# Patient Record
Sex: FEMALE | URBAN - METROPOLITAN AREA
[De-identification: names, ages, dates, MRNs, and addresses within clinical notes are randomized per-mention and may not be internally consistent; named-entity substitution may affect disease eponyms.]

---

## 2022-10-04 ENCOUNTER — OFFICE VISIT (OUTPATIENT)
Dept: URBAN - METROPOLITAN AREA CLINIC 56 | Facility: CLINIC | Age: 27
End: 2022-10-04
Payer: COMMERCIAL

## 2022-10-04 DIAGNOSIS — H00.011 HORDEOLUM EXTERNUM RIGHT UPPER EYELID: Primary | ICD-10-CM

## 2022-10-04 PROCEDURE — 99204 OFFICE O/P NEW MOD 45 MIN: CPT | Performed by: STUDENT IN AN ORGANIZED HEALTH CARE EDUCATION/TRAINING PROGRAM

## 2022-10-04 RX ORDER — AMOXICILLIN AND CLAVULANATE POTASSIUM 875; 125 MG/1; MG/1
TABLET, FILM COATED ORAL
Qty: 20 | Refills: 0 | Status: ACTIVE
Start: 2022-10-04

## 2022-10-04 ASSESSMENT — INTRAOCULAR PRESSURE
OS: 15
OD: 15

## 2022-10-04 ASSESSMENT — KERATOMETRY
OD: 41.88
OS: 41.67

## 2022-10-04 NOTE — IMPRESSION/PLAN
Impression: Hordeolum externum right upper eyelid: H00.011. Plan: x 4 days. Has had one previous occurrence 6 mos ago. Discussed maintenance heat for prevention. Discussed possible chalazion formation. Start Augmentin 875 BID PO Cont frequent WC with heat mask RTC in 2 weeks for f/u. Call if pain or vision worsen.

## 2022-11-03 ENCOUNTER — OFFICE VISIT (OUTPATIENT)
Dept: URBAN - METROPOLITAN AREA CLINIC 56 | Facility: CLINIC | Age: 27
End: 2022-11-03
Payer: COMMERCIAL

## 2022-11-03 DIAGNOSIS — H00.11 CHALAZION RIGHT UPPER LID: Primary | ICD-10-CM

## 2022-11-03 PROCEDURE — 99214 OFFICE O/P EST MOD 30 MIN: CPT | Performed by: STUDENT IN AN ORGANIZED HEALTH CARE EDUCATION/TRAINING PROGRAM

## 2022-11-03 RX ORDER — SULFAMETHOXAZOLE AND TRIMETHOPRIM 800; 160 MG/1; MG/1
TABLET ORAL
Qty: 14 | Refills: 0 | Status: ACTIVE
Start: 2022-11-03

## 2022-11-03 ASSESSMENT — INTRAOCULAR PRESSURE
OS: 15
OD: 15

## 2022-11-03 NOTE — IMPRESSION/PLAN
Impression: Chalazion right upper lid: H00.11. Plan: pt discontinued Augmentin after developing rash on tongue, no known hx of PCN allergy, listed as allergy in chart today. Chalazion formation with mild intermittent pain - restart abx and refer for chalazion excision. Discussed maintenance heat for prevention. Start Bactrim DS BID x 7 days, cont daily WC with heat mask Refer for chalazion excision - Dr. Rubi Krishna or Dr. Jimenez Sine